# Patient Record
Sex: MALE
[De-identification: names, ages, dates, MRNs, and addresses within clinical notes are randomized per-mention and may not be internally consistent; named-entity substitution may affect disease eponyms.]

---

## 2018-11-13 ENCOUNTER — HOSPITAL ENCOUNTER (INPATIENT)
Dept: HOSPITAL 31 - C.ER | Age: 63
LOS: 5 days | Discharge: HOME | DRG: 361 | End: 2018-11-18
Attending: FAMILY MEDICINE | Admitting: FAMILY MEDICINE
Payer: COMMERCIAL

## 2018-11-13 DIAGNOSIS — R19.7: ICD-10-CM

## 2018-11-13 DIAGNOSIS — N40.1: ICD-10-CM

## 2018-11-13 DIAGNOSIS — L03.116: Primary | ICD-10-CM

## 2018-11-13 DIAGNOSIS — B95.62: ICD-10-CM

## 2018-11-13 DIAGNOSIS — L02.416: ICD-10-CM

## 2018-11-13 DIAGNOSIS — Z87.891: ICD-10-CM

## 2018-11-13 LAB
ALBUMIN SERPL-MCNC: 4.2 G/DL (ref 3.5–5)
ALBUMIN/GLOB SERPL: 1.6 {RATIO} (ref 1–2.1)
ALT SERPL-CCNC: 23 U/L (ref 21–72)
APTT BLD: 41 SECONDS (ref 21–34)
AST SERPL-CCNC: 14 U/L (ref 17–59)
BACTERIA #/AREA URNS HPF: (no result) /[HPF]
BASOPHILS # BLD AUTO: 0.1 K/UL (ref 0–0.2)
BASOPHILS NFR BLD: 1.2 % (ref 0–2)
BILIRUB UR-MCNC: NEGATIVE MG/DL
BUN SERPL-MCNC: 17 MG/DL (ref 9–20)
CALCIUM SERPL-MCNC: 8.9 MG/DL (ref 8.6–10.4)
CK MB SERPL-MCNC: 0.75 NG/ML (ref 0–3.38)
EOSINOPHIL # BLD AUTO: 0.1 K/UL (ref 0–0.7)
EOSINOPHIL NFR BLD: 0.9 % (ref 0–4)
ERYTHROCYTE [DISTWIDTH] IN BLOOD BY AUTOMATED COUNT: 14 % (ref 11.5–14.5)
GFR NON-AFRICAN AMERICAN: > 60
GLUCOSE UR STRIP-MCNC: NORMAL MG/DL
HGB BLD-MCNC: 12.5 G/DL (ref 12–18)
INR PPP: 1.2
LEUKOCYTE ESTERASE UR-ACNC: (no result) LEU/UL
LYMPHOCYTES # BLD AUTO: 1.2 K/UL (ref 1–4.3)
LYMPHOCYTES NFR BLD AUTO: 13.9 % (ref 20–40)
MCH RBC QN AUTO: 31.6 PG (ref 27–31)
MCHC RBC AUTO-ENTMCNC: 34.8 G/DL (ref 33–37)
MCV RBC AUTO: 90.8 FL (ref 80–94)
MONOCYTES # BLD: 0.9 K/UL (ref 0–0.8)
MONOCYTES NFR BLD: 11 % (ref 0–10)
NEUTROPHILS # BLD: 6.1 K/UL (ref 1.8–7)
NEUTROPHILS NFR BLD AUTO: 73 % (ref 50–75)
NRBC BLD AUTO-RTO: 0 % (ref 0–2)
PH UR STRIP: 6 [PH] (ref 5–8)
PLATELET # BLD: 225 K/UL (ref 130–400)
PMV BLD AUTO: 9.8 FL (ref 7.2–11.7)
PROT UR STRIP-MCNC: NEGATIVE MG/DL
PROTHROMBIN TIME: 13.2 SECONDS (ref 9.7–12.2)
RBC # BLD AUTO: 3.96 MIL/UL (ref 4.4–5.9)
RBC # UR STRIP: NEGATIVE /UL
SP GR UR STRIP: 1.02 (ref 1–1.03)
SQUAMOUS EPITHIAL: < 1 /HPF (ref 0–5)
UROBILINOGEN UR-MCNC: 2 MG/DL (ref 0.2–1)
WBC # BLD AUTO: 8.4 K/UL (ref 4.8–10.8)

## 2018-11-13 RX ADMIN — TAZOBACTAM SODIUM AND PIPERACILLIN SODIUM SCH MLS/HR: 375; 3 INJECTION, SOLUTION INTRAVENOUS at 22:01

## 2018-11-13 NOTE — C.PDOC
History Of Present Illness





64 yo male w/o significant PMHx come in for evaluation of Right lower leg 

painful mass gradually developed for past 1 week. Pt reports, noted some wound 

discharge now, redness around it and swelling. Pt admits, similar sx in past " 

not as bad". Otherwise, pt denies known trauma or injury, fever, chills, denies 

sore throat, cough, CP, SOB, dyspnea, denies weakness, sensory or vascular 

deficits to Right leg. Ambulate to Ed for evaluation, appears in pain.


Time Seen by Provider: 11/13/18 12:49


Chief Complaint (Nursing): Lower Extremity Problem/Injury


History Per: Patient


Onset/Duration Of Symptoms: Gradual





Past Medical History


Reviewed: Historical Data, Nursing Documentation, Vital Signs


Vital Signs: 





                                Last Vital Signs











Temp  98.7 F   11/13/18 12:34


 


Pulse  66   11/13/18 12:34


 


Resp  20   11/13/18 12:34


 


BP  108/62   11/13/18 12:34


 


Pulse Ox  98   11/13/18 12:34














- Medical History


PMH: No Chronic Diseases


Family History: States: No Known Family Hx





- Social History


Hx Tobacco Use: Yes


Hx Alcohol Use: No


Hx Substance Use: No





- Immunization History


Hx Tetanus Toxoid Vaccination: No


Hx Influenza Vaccination: No


Hx Pneumococcal Vaccination: No





Review Of Systems


Except As Marked, All Systems Reviewed And Found Negative.


Constitutional: Negative for: Fever, Chills


ENT: Negative for: Throat Pain


Cardiovascular: Negative for: Chest Pain, Palpitations


Respiratory: Negative for: Cough, Shortness of Breath, Wheezing


Gastrointestinal: Negative for: Nausea, Vomiting, Abdominal Pain


Genitourinary: Negative for: Incontinence


Musculoskeletal: Positive for: Leg Pain


Skin: Positive for: Lesions


Neurological: Negative for: Weakness, Numbness, Headache, Dizziness





Physical Exam





- Physical Exam


Appears: Well, Non-toxic, No Acute Distress


Skin: Normal Color, Warm, Other (open wound Right calf area 3cm diameter with 

greenish/bloody discharge, diffuse surround erythema, (+) prxomal streaking, (-)

flactulance.)


Head: Normacephalic


Eye(s): bilateral: PERRL


Nose: No Flaring, No Discharge


Oral Mucosa: Moist


Throat: No Erythema, No Drooling


Neck: Trachea Midline, Supple


Cardiovascular: Rhythm Regular, No Murmur, No JVD


Respiratory: No Decreased Breath Sounds, No Accessory Muscle Use, No Rales, No 

Rhonchi, No Stridor, No Wheezing


Gastrointestinal/Abdominal: Soft, No Tenderness, No Distention, No Guarding


Extremity: Normal ROM, No Pedal Edema, Calf Tenderness (Right), Capillary Refill

(less than 2sec to Right foot), No Deformity, Swelling (mild trace Right ankle 

edema.)


Neurological/Psych: Oriented x3, Normal Speech, Normal Motor, Normal Sensation, 

Normal Reflexes





ED Course And Treatment





- Laboratory Results


Result Diagrams: 


                                 11/13/18 14:09





                                 11/13/18 14:09


Lab Interpretation: No Acute Changes


O2 Sat by Pulse Oximetry: 98


Pulse Ox Interpretation: Normal





- CT Scan/US


  ** Doppler US RLE


Other Rad Studies (CT/US): Read By Radiologist


CT/US Interpretation: (-) DVT


Progress Note: Pt remained stable during the ED evaluation.  case discussed with

Hospitalist and admission arranged OBS with Dx: Right calf cellulitis with 

abscess.





Disposition





- Disposition


Disposition: HOSPITALIZED


Disposition Time: 15:16


Condition: STABLE





- Clinical Impression


Clinical Impression: 


 Cellulitis and abscess of leg

## 2018-11-13 NOTE — CP.PCM.HP
History of Present Illness





- History of Present Illness


History of Present Illness: 





Pt is a 62yo M with no PMH presenting with a painful mass on the R leg x 6 days.

He reports redness, swelling, and a throbbing pain he rates 10/10. He took 

ibuprofen at home which helped alleviate the pain slightly. He reports also usi

ng antibiotic cream at home. He reports previous history of these masses, 5 

times before. They are usually drained and resolve.He reports associated 

numbness and tingling in the area, chills, shortness of breath, nausea, and 

cough. He reports an episode of stinging chest pain on the L side, which ra

diated to the L arm and lasted for 5 min. He denies previous h/o of chest pain. 

He reports dysuria for 7 months, with associated burning, increased urinary 

frequency, and feeling of incomplete voiding. Pt denies diarrhea, abdominal 

pain, fever.





SxH: denies


FamH: mom DM, dad back problems


SocH: denies tobacco, etoh, drug us


Meds: ibuporfen, multivitamins


Allergies: NKDA


PMD: none


  





Present on Admission





- Present on Admission


Any Indicators Present on Admission: No





Review of Systems





- Review of Systems


Review of Systems: 





as per HPI








Past Patient History





- Past Social History


Smoking Status: Never Smoked





- PSYCHIATRIC


Hx Substance Use: No





- ANESTHESIA


Hx Anesthesia: No





Meds


Allergies/Adverse Reactions: 


                                    Allergies











Allergy/AdvReac Type Severity Reaction Status Date / Time


 


No Known Allergies Allergy   Verified 11/13/18 12:33














Physical Exam





- Constitutional


Appears: Well, No Acute Distress





- Head Exam


Head Exam: ATRAUMATIC, NORMOCEPHALIC





- Eye Exam


Eye Exam: EOMI, Normal appearance, PERRL


Pupil Exam: NORMAL ACCOMODATION





- ENT Exam


ENT Exam: Mucous Membranes Moist





- Respiratory Exam


Respiratory Exam: Clear to Auscultation Bilateral, NORMAL BREATHING PATTERN.  

absent: Rales, Rhonchi, Wheezes





- Cardiovascular Exam


Cardiovascular Exam: REGULAR RHYTHM, +S1, +S2.  absent: Gallop, Rubs, Systolic 

Murmur





- GI/Abdominal Exam


GI & Abdominal Exam: Normal Bowel Sounds, Soft.  absent: Firm, Tenderness





- Extremities Exam


Additional comments: 





LLE: posterior calf erythematous 3cm nodule with clear drainage and surrounding 

erythema and edema. 








- Neurological Exam


Neurological exam: Alert, Oriented x3





- Psychiatric Exam


Psychiatric exam: Normal Affect, Normal Mood





Results





- Vital Signs


Recent Vital Signs: 





                                Last Vital Signs











Temp  98.2 F   11/13/18 16:01


 


Pulse  64   11/13/18 16:01


 


Resp  16   11/13/18 16:01


 


BP  118/65   11/13/18 16:01


 


Pulse Ox  99   11/13/18 16:01














- Labs


Result Diagrams: 


                                 11/13/18 14:09





                                 11/13/18 14:09


Labs: 





                         Laboratory Results - last 24 hr











  11/13/18 11/13/18 11/13/18





  14:09 14:09 14:09


 


WBC  8.4  


 


RBC  3.96 L  


 


Hgb  12.5  


 


Hct  35.9  


 


MCV  90.8  


 


MCH  31.6 H  


 


MCHC  34.8  


 


RDW  14.0  


 


Plt Count  225  


 


MPV  9.8  


 


Neut % (Auto)  73.0  


 


Lymph % (Auto)  13.9 L  


 


Mono % (Auto)  11.0 H  


 


Eos % (Auto)  0.9  


 


Baso % (Auto)  1.2  


 


Neut # (Auto)  6.1  


 


Lymph # (Auto)  1.2  


 


Mono # (Auto)  0.9 H  


 


Eos # (Auto)  0.1  


 


Baso # (Auto)  0.1  


 


PT   13.2 H 


 


INR   1.2 


 


APTT   41 H 


 


Sodium    136


 


Potassium    4.2


 


Chloride    100


 


Carbon Dioxide    29


 


Anion Gap    10


 


BUN    17


 


Creatinine    0.7 L


 


Est GFR ( Amer)    > 60


 


Est GFR (Non-Af Amer)    > 60


 


Random Glucose    94


 


Calcium    8.9


 


Total Bilirubin    1.1


 


AST    14 L


 


ALT    23


 


Alkaline Phosphatase    55


 


Total Protein    6.9


 


Albumin    4.2


 


Globulin    2.7


 


Albumin/Globulin Ratio    1.6


 


Urine Color   


 


Urine Clarity   


 


Urine pH   


 


Ur Specific Gravity   


 


Urine Protein   


 


Urine Glucose (UA)   


 


Urine Ketones   


 


Urine Blood   


 


Urine Nitrate   


 


Urine Bilirubin   


 


Urine Urobilinogen   


 


Ur Leukocyte Esterase   


 


Urine WBC (Auto)   


 


Urine RBC (Auto)   


 


Ur Squamous Epith Cells   


 


Urine Bacteria   














  11/13/18





  16:11


 


WBC 


 


RBC 


 


Hgb 


 


Hct 


 


MCV 


 


MCH 


 


MCHC 


 


RDW 


 


Plt Count 


 


MPV 


 


Neut % (Auto) 


 


Lymph % (Auto) 


 


Mono % (Auto) 


 


Eos % (Auto) 


 


Baso % (Auto) 


 


Neut # (Auto) 


 


Lymph # (Auto) 


 


Mono # (Auto) 


 


Eos # (Auto) 


 


Baso # (Auto) 


 


PT 


 


INR 


 


APTT 


 


Sodium 


 


Potassium 


 


Chloride 


 


Carbon Dioxide 


 


Anion Gap 


 


BUN 


 


Creatinine 


 


Est GFR ( Amer) 


 


Est GFR (Non-Af Amer) 


 


Random Glucose 


 


Calcium 


 


Total Bilirubin 


 


AST 


 


ALT 


 


Alkaline Phosphatase 


 


Total Protein 


 


Albumin 


 


Globulin 


 


Albumin/Globulin Ratio 


 


Urine Color  Yellow


 


Urine Clarity  Clear


 


Urine pH  6.0


 


Ur Specific Gravity  1.019


 


Urine Protein  Negative


 


Urine Glucose (UA)  Normal


 


Urine Ketones  Negative


 


Urine Blood  Negative


 


Urine Nitrate  Negative


 


Urine Bilirubin  Negative


 


Urine Urobilinogen  2.0


 


Ur Leukocyte Esterase  Neg


 


Urine WBC (Auto)  2


 


Urine RBC (Auto)  2


 


Ur Squamous Epith Cells  < 1


 


Urine Bacteria  Rare














Assessment & Plan





- Assessment and Plan (Free Text)


Assessment: 





62yo M with no PMH presenting with a painful mass on the R leg x 6 days admitted

for evaluation and treatment of cellulitis





Plan: 





LLE Cellulitis


- previous history of nodules on LE that resolve with I&D


- likely hidratenitis suppurativa


- continue vanc and zosyn


- f/u BCx, UCx, wound Cx


- f/u procal


- f/u CT


- Doppler LE: no DVT


- pt afebrile, no leukocytosis


- tylenol PRN





Dysuria


- 7month h/o dysuria, pyruia, increased urinary frequency


- HbA1c


- f/u UA, UCx





PPX


GI: Pepcid


DVT: not indicated at this time





Case reviewed with Dr. Kingston

## 2018-11-14 LAB
ALBUMIN SERPL-MCNC: 3.9 G/DL (ref 3.5–5)
ALBUMIN/GLOB SERPL: 1.5 {RATIO} (ref 1–2.1)
ALT SERPL-CCNC: 20 U/L (ref 21–72)
ANISOCYTOSIS BLD QL SMEAR: SLIGHT
APTT BLD: 38 SECONDS (ref 21–34)
AST SERPL-CCNC: 10 U/L (ref 17–59)
BASOPHILS # BLD AUTO: 0.1 K/UL (ref 0–0.2)
BASOPHILS NFR BLD: 0.5 % (ref 0–2)
BUN SERPL-MCNC: 17 MG/DL (ref 9–20)
CALCIUM SERPL-MCNC: 8.7 MG/DL (ref 8.6–10.4)
CK MB SERPL-MCNC: 0.69 NG/ML (ref 0–3.38)
CK MB SERPL-MCNC: 0.73 NG/ML (ref 0–3.38)
EOSINOPHIL # BLD AUTO: 0 K/UL (ref 0–0.7)
EOSINOPHIL NFR BLD: 0.1 % (ref 0–4)
ERYTHROCYTE [DISTWIDTH] IN BLOOD BY AUTOMATED COUNT: 14.2 % (ref 11.5–14.5)
GFR NON-AFRICAN AMERICAN: > 60
HGB BLD-MCNC: 11.9 G/DL (ref 12–18)
INR PPP: 1.2
LYMPHOCYTE: 8 % (ref 20–40)
LYMPHOCYTES # BLD AUTO: 0.8 K/UL (ref 1–4.3)
LYMPHOCYTES NFR BLD AUTO: 6.5 % (ref 20–40)
MCH RBC QN AUTO: 30.7 PG (ref 27–31)
MCHC RBC AUTO-ENTMCNC: 34 G/DL (ref 33–37)
MCV RBC AUTO: 90.2 FL (ref 80–94)
MONOCYTE: 6 % (ref 0–10)
MONOCYTES # BLD: 0.7 K/UL (ref 0–0.8)
MONOCYTES NFR BLD: 6.3 % (ref 0–10)
NEUTROPHILS # BLD: 10.2 K/UL (ref 1.8–7)
NEUTROPHILS NFR BLD AUTO: 86 % (ref 50–75)
NEUTROPHILS NFR BLD AUTO: 86.6 % (ref 50–75)
NRBC BLD AUTO-RTO: 0 % (ref 0–2)
PLATELET # BLD EST: NORMAL 10*3/UL
PLATELET # BLD: 237 K/UL (ref 130–400)
PMV BLD AUTO: 9.6 FL (ref 7.2–11.7)
POIKILOCYTOSIS BLD QL SMEAR: SLIGHT
PROTHROMBIN TIME: 12.7 SECONDS (ref 9.7–12.2)
RBC # BLD AUTO: 3.89 MIL/UL (ref 4.4–5.9)
TOTAL CELLS COUNTED BLD: 100
WBC # BLD AUTO: 11.7 K/UL (ref 4.8–10.8)

## 2018-11-14 RX ADMIN — Medication SCH CAP: at 18:09

## 2018-11-14 RX ADMIN — Medication SCH CAP: at 10:11

## 2018-11-14 RX ADMIN — TAZOBACTAM SODIUM AND PIPERACILLIN SODIUM SCH MLS/HR: 375; 3 INJECTION, SOLUTION INTRAVENOUS at 14:12

## 2018-11-14 RX ADMIN — VANCOMYCIN HYDROCHLORIDE SCH MLS/HR: 1 INJECTION, POWDER, LYOPHILIZED, FOR SOLUTION INTRAVENOUS at 11:50

## 2018-11-14 RX ADMIN — TAZOBACTAM SODIUM AND PIPERACILLIN SODIUM SCH MLS/HR: 375; 3 INJECTION, SOLUTION INTRAVENOUS at 22:07

## 2018-11-14 RX ADMIN — TAZOBACTAM SODIUM AND PIPERACILLIN SODIUM SCH MLS/HR: 375; 3 INJECTION, SOLUTION INTRAVENOUS at 06:01

## 2018-11-14 RX ADMIN — VANCOMYCIN HYDROCHLORIDE SCH MLS/HR: 1 INJECTION, POWDER, LYOPHILIZED, FOR SOLUTION INTRAVENOUS at 23:45

## 2018-11-14 NOTE — VASCLAB
Date of service: 



11/13/2018



PROCEDURE:  Left Lower Extremity Venous Duplex Exam. 



HISTORY:

Pain, open wound 



PRIORS:

None. 



TECHNIQUE:

Left common femoral, femoral, popliteal and posterior tibial, 

peroneal and great saphenous veins were evaluated. Flow was assessed 

with color Doppler, compressibility, assessment of phasic flow and 

augmentation response.



Report prepared by   LUCINDA Vargas



FINDINGS:



LEFT:

1. Common Femoral Vein:



1.1.  Compressibility - Fully compressible: Thrombus - None : Flow - 

Phasic: Augmentation -Normal: Reflux - None.



2. Femoral Vein: 



2.1. Compressibility - Fully compressible: Thrombus -  None: Flow - 

Phasic: Augmentation -Normal: Reflux - None.



3. Popliteal Vein: 



3.1. Compressibility - Fully compressible: Thrombus -  None: Flow - 

Phasic: Augmentation -Normal: Reflux - None.



4. Posterior Tibial Vein: 



4.1. Compressibility - Fully compressible: Thrombus -  None: Flow - 

Phasic: Augmentation -Normal: Reflux - None.



5. Peroneal Vein: 



5.1. Compressibility - Fully compressible: Thrombus -  None: Flow - 

Phasic: Augmentation -Normal: Reflux - None.



6. Great Saphenous Vein:

6.1.  Compressibility - Fully compressible: Thrombus - None: Flow - 

Phasic: Augmentation - Normal: Reflux - None.





OTHER FINDINGS:  Enlarged lymph node was noted in the left groin area.



IMPRESSION:

No evidence of deep or superficial vein thrombosis of the left lower 

extremity with excellent venous flow. Normal valve function noted of 

the left side.     



Normal venous flow noted in the right common femoral vein.

## 2018-11-14 NOTE — CP.PCM.PN
<Deepthi Lau - Last Filed: 11/14/18 16:19>





Subjective





- Date & Time of Evaluation


Date of Evaluation: 11/14/18


Time of Evaluation: 11:30





- Subjective


Subjective: 


PGY-1 Medicine Progress Note for Dr. LOS Reid





Patient was seen and examined today at bedside in no acute distress. Nurse 

reports no overnight events. Patient is resting comfortably and has no new 

complaints. Fullness in calf is still bothering him same as yesterday. Denies 

headache, shortness of breath, fever, chills, difficulty moving LE, n/v/c/d.





Objective





- Vital Signs/Intake and Output


Vital Signs (last 24 hours): 


                                        











Temp Pulse Resp BP Pulse Ox


 


 97.3 F L  60   20   123/75   97 


 


 11/14/18 08:09  11/14/18 08:09  11/14/18 08:09  11/14/18 08:09  11/14/18 08:09











- Medications


Medications: 


                               Current Medications





Acetaminophen (Tylenol 325mg Tab)  650 mg PO Q6 PRN


   PRN Reason: Pain, moderate (4-7)


Acetaminophen (Tylenol 325mg Tab)  650 mg PO Q6 PRN


   PRN Reason: Fever >100.4 F


Famotidine (Pepcid)  20 mg PO BID [facility]


   Last Admin: 11/14/18 10:11 Dose:  20 mg


Heparin Sodium (Porcine) (Heparin)  5,000 units SC Q12 [facility]


   Last Admin: 11/14/18 10:12 Dose:  5,000 units


Piperacillin Sod/Tazobactam Sod (Zosyn 3.375 Gm Iv Premix)  3.375 gm in 50 mls @

200 mls/hr IVPB Q8H [facility]; Protocol


   Last Admin: 11/14/18 06:01 Dose:  200 mls/hr


Vancomycin/Sodium Chloride (Vancomycin 1 Gm/Ns 200 Ml)  1 gm in 200 mls @ 133 

mls/hr IVPB Q12H [facility]; Protocol


   Stop: 11/19/18 11:31


   Last Admin: 11/14/18 11:50 Dose:  133 mls/hr


Lactobacillus Acidophilus (Bacid Acidophilus)  1 cap PO BID [facility]


   Last Admin: 11/14/18 10:11 Dose:  1 cap


Tamsulosin HCl (Flomax)  0.4 mg PO DAILY [facility]


   Last Admin: 11/14/18 10:11 Dose:  0.4 mg











- Labs


Labs: 


                                        





                                 11/14/18 04:45 





                                 11/14/18 04:45 





                                        











PT  12.7 SECONDS (9.7-12.2)  H  11/14/18  04:45    


 


INR  1.2   11/14/18  04:45    


 


APTT  38 SECONDS (21-34)  H  11/14/18  04:45    














- Constitutional


Appears: Well, No Acute Distress





- Head Exam


Head Exam: ATRAUMATIC, NORMOCEPHALIC





- Eye Exam


Eye Exam: EOMI, Normal appearance, PERRL





- ENT Exam


ENT Exam: Mucous Membranes Moist





- Respiratory Exam


Respiratory Exam: Clear to Ausculation Bilateral, NORMAL BREATHING PATTERN.  

absent: Rales, Rhonchi, Wheezes





- Cardiovascular Exam


Cardiovascular Exam: REGULAR RHYTHM, +S1, +S2.  absent: Gallop, Rubs, Murmur





- GI/Abdominal Exam


GI & Abdominal Exam: Soft, Normal Bowel Sounds.  absent: Distended, Firm, 

Tenderness





-  Exam


Additional comments: 


MIGNON performed by Dr. LOS Reid, palpable enlarged prostate





- Extremities Exam


Extremities Exam: Normal Capillary Refill.  absent: Calf Tenderness, Pedal Edema


Additional comments: 


peripheral pulses palpable bilaterally (radial, DP)


IV access in R arm





- Neurological Exam


Neurological Exam: Alert, Awake, Oriented x3





- Psychiatric Exam


Psychiatric exam: Normal Affect, Normal Mood





- Skin


Skin Exam: Normal Color


Additional comments: 


LLE: posterior calf erythematous 3cm nodule with clear drainage and surrounding 

erythema and edema. 


multiple healing wounds from previous abscesses





Assessment and Plan





- Assessment and Plan (Free Text)


Assessment: 


64yo M with no PMH presenting with a painful mass on the R leg x 6 days admitted

for evaluation and treatment of cellulitis


Plan: 


LLE Cellulitis


- previous history of nodules on LE that resolve with I&D


- likely hidratenitis suppurativa


- Wound Cx (11/13): gram positive cocci, pending species and sensitivities


- Urine Cx (11/13): neg


- f/u blood Cx, MRSA screen


- Vanc 1g IVPB q12 (started 11/13)


   Vanc Trough 11/15@1100


- Zosyn 3.375g IVPB q8 (started 11/13)


- Procal <0.05


- CT LE (11/13): 2.5x1.0cm dermal thickenin and phlegmon at midline posterior 

calf subQ fat and dermis with L leg cellulitis, no appreciable abscess, no 

osteomyelitis.


- Doppler LE: no DVT


- pt afebrile, no leukocytosis


- tylenol PRN


- Surgery consulted: Dr. BALAJI Reid - help appreciated





Dysuria


- 7month h/o dysuria, pyruia, increased urinary frequency


- Hgb A1c: 5.2


- UA neg


- Urine Cx neg


- Flomax 0.4mg po daily





PPX


DVT: Heparin 5000u sc q12


GI: Pepcid 20 po bid


Diet: HHD





d/w Dr. LOS Lau PGY-1





<Checo Reid - Last Filed: 11/17/18 20:00>





Objective





- Vital Signs/Intake and Output


Vital Signs (last 24 hours): 


                                        











Temp Pulse Resp BP Pulse Ox


 


 98.3 F   56 L  20   118/73   96 


 


 11/17/18 15:17  11/17/18 15:17  11/17/18 15:17  11/17/18 15:17  11/17/18 15:17








Intake and Output: 


                                        











 11/17/18 11/18/18





 18:59 06:59


 


Intake Total 450 


 


Balance 450 














- Medications


Medications: 


                               Current Medications





Acetaminophen (Tylenol 325mg Tab)  650 mg PO Q6 PRN


   PRN Reason: Pain, moderate (4-7)


   Last Admin: 11/17/18 07:18 Dose:  650 mg


Acetaminophen (Tylenol 325mg Tab)  650 mg PO Q6 PRN


   PRN Reason: Fever >100.4 F


Famotidine (Pepcid)  20 mg PO BID [facility]


   Last Admin: 11/17/18 17:51 Dose:  20 mg


Heparin Sodium (Porcine) (Heparin)  5,000 units SC Q12 [facility]


Clindamycin Phosphate (Cleocin 600mg/50ml Ns)  600 mg in 50 mls @ 100 mls/hr IVP

B Q8H [facility]; Protocol


   Last Admin: 11/17/18 19:21 Dose:  100 mls/hr


Ibuprofen (Motrin Tab)  600 mg PO TID PRN


   PRN Reason: Pain, moderate (4-7)


   Last Admin: 11/17/18 10:03 Dose:  600 mg


Lactobacillus Acidophilus (Bacid Acidophilus)  1 cap PO BID [facility]


   Last Admin: 11/17/18 17:51 Dose:  1 cap


Tamsulosin HCl (Flomax)  0.4 mg PO DAILY [facility]


   Last Admin: 11/17/18 10:03 Dose:  0.4 mg











- Labs


Labs: 


                                        





                                 11/17/18 06:35 





                                 11/17/18 06:35 





                                        











PT  11.9 SECONDS (9.7-12.2)   11/16/18  06:46    


 


INR  1.1   11/16/18  06:46    


 


APTT  37 SECONDS (21-34)  H  11/16/18  06:46    














Attending/Attestation





- Attestation


I have personally seen and examined this patient.: Yes


I have fully participated in the care of the patient.: Yes


I have reviewed all pertinent clinical information, including history, physical 

exam and plan: Yes


Notes (Text): 





11/17/18 19:57


This is a late entry.





Care of this patient was gone over in detail with resident Dr. Ramos.





Patient was seen and examined at 8:15 AM on 11/14/18.





Noted enlarged smooth prostate upon rectal exam


Patient complained of LUTS at the time of my exam: at times difficulty starting 

urinary stream, feeling of incomplete evacuation of bladder, at times difficulty

with erection


Flomax started


Patient was counseled on the need for further Urology evaluation upon his 

discharge. He expressed understanding.





Checo Reid D.O.

## 2018-11-14 NOTE — CT
Date of service: 



11/14/2018



PROCEDURE:  LOWER EXTREMITY CT, LEG, WITH CONTRAST



HISTORY:

cellulitis; open wound left leg 



COMPARISON:

No prior CT or MRI available. 



TECHNIQUE:

A volumetric CT acquisition through the left leg was performed from 

the left knee to the ankle prior to and following intravenous 

contrast administration.



Contrast Dose: Visipaque 320, 100 cc



Radiation dose:Total exam DLP = 1076.00 mGy-cm.



This CT exam was performed using one or more of the following dose 

reduction techniques: Automated exposure control, adjustment of the 

mA and/or kV according to patient size, and/or use of iterative 

reconstruction technique.







FINDINGS:

Relatively extensive cellulitis changes are seen throughout the left 

leg concentrated at the mid to lower left leg and ankle and manifest 

by increased reticular changes in superficial and deep subcutaneous 

fat as well as dermal thickening.  No emphysematous change are 

identified or definitive retained radiodense foreign body.  There is 

a focal area of dermal thickening and subcutaneous edema and likely 

phlegmon at the skin medially posterior to the mid calf level at the 

posterior midline.  No peripheral enhancement is appropriate socially 

aided that would suggest an abscess at this time.  Clinically 

correlate further.  This small area measures 2.5 x 1.0 cm.



No lytic or blastic bony changes are identified related to the tibia 

or the fibula and the vascular sheaths appear unremarkable as imaged.



IMPRESSION:

1. There is a 2.5 x 1.0 cm of dermal thickening and phlegmon at the 

midline posterior calf subcutaneous fat and dermis as well as diffuse 

left leg cellulitis.  No definitive abscess appreciable at this time. 



2. No overt CT pattern to suggest osteomyelitis.  No suspicious 

contrast enhancement pattern.

## 2018-11-14 NOTE — RAD
Date of service: 



11/14/2018



HISTORY:

 SOB/Cough upon admission 



COMPARISON:

No prior.



TECHNIQUE:

Chest PA and lateral



FINDINGS:



LUNGS:

No active pulmonary disease.



PLEURA:

No significant pleural effusion identified. No pneumothorax apparent.



CARDIOVASCULAR:

No aortic atherosclerotic calcification present.



Normal cardiac size. No pulmonary vascular congestion. 



OSSEOUS STRUCTURES:

No significant abnormalities.



VISUALIZED UPPER ABDOMEN:

Normal.



OTHER FINDINGS:

None.



IMPRESSION:

No active disease.

## 2018-11-15 LAB
ALBUMIN SERPL-MCNC: 3.9 G/DL (ref 3.5–5)
ALBUMIN/GLOB SERPL: 1.5 {RATIO} (ref 1–2.1)
ALT SERPL-CCNC: 26 U/L (ref 21–72)
APTT BLD: 37 SECONDS (ref 21–34)
AST SERPL-CCNC: 16 U/L (ref 17–59)
BASOPHILS # BLD AUTO: 0.1 K/UL (ref 0–0.2)
BASOPHILS NFR BLD: 1.2 % (ref 0–2)
BUN SERPL-MCNC: 16 MG/DL (ref 9–20)
CALCIUM SERPL-MCNC: 8.6 MG/DL (ref 8.6–10.4)
EOSINOPHIL # BLD AUTO: 0.1 K/UL (ref 0–0.7)
EOSINOPHIL NFR BLD: 1.6 % (ref 0–4)
ERYTHROCYTE [DISTWIDTH] IN BLOOD BY AUTOMATED COUNT: 13.5 % (ref 11.5–14.5)
GFR NON-AFRICAN AMERICAN: > 60
HGB BLD-MCNC: 12.5 G/DL (ref 12–18)
INR PPP: 1
LYMPHOCYTES # BLD AUTO: 1.9 K/UL (ref 1–4.3)
LYMPHOCYTES NFR BLD AUTO: 24.3 % (ref 20–40)
MCH RBC QN AUTO: 34.9 PG (ref 27–31)
MCHC RBC AUTO-ENTMCNC: 35.5 G/DL (ref 33–37)
MCV RBC AUTO: 98.3 FL (ref 80–94)
MONOCYTES # BLD: 0.8 K/UL (ref 0–0.8)
MONOCYTES NFR BLD: 10.2 % (ref 0–10)
NEUTROPHILS # BLD: 5 K/UL (ref 1.8–7)
NEUTROPHILS NFR BLD AUTO: 62.7 % (ref 50–75)
NRBC BLD AUTO-RTO: 0 % (ref 0–2)
PLATELET # BLD: 266 K/UL (ref 130–400)
PMV BLD AUTO: 10.2 FL (ref 7.2–11.7)
PROTHROMBIN TIME: 11.3 SECONDS (ref 9.7–12.2)
RBC # BLD AUTO: 3.58 MIL/UL (ref 4.4–5.9)
WBC # BLD AUTO: 8 K/UL (ref 4.8–10.8)

## 2018-11-15 PROCEDURE — 0J9P0ZZ DRAINAGE OF LEFT LOWER LEG SUBCUTANEOUS TISSUE AND FASCIA, OPEN APPROACH: ICD-10-PCS | Performed by: SURGERY

## 2018-11-15 PROCEDURE — 0HBLXZZ EXCISION OF LEFT LOWER LEG SKIN, EXTERNAL APPROACH: ICD-10-PCS | Performed by: SURGERY

## 2018-11-15 RX ADMIN — Medication SCH CAP: at 09:57

## 2018-11-15 RX ADMIN — TAZOBACTAM SODIUM AND PIPERACILLIN SODIUM SCH: 375; 3 INJECTION, SOLUTION INTRAVENOUS at 15:19

## 2018-11-15 RX ADMIN — Medication SCH CAP: at 22:12

## 2018-11-15 RX ADMIN — CLINDAMYCIN PHOSPHATE SCH MLS/HR: 150 INJECTION, SOLUTION INTRAVENOUS at 16:30

## 2018-11-15 RX ADMIN — VANCOMYCIN HYDROCHLORIDE SCH MLS/HR: 1 INJECTION, POWDER, LYOPHILIZED, FOR SOLUTION INTRAVENOUS at 12:11

## 2018-11-15 RX ADMIN — CLINDAMYCIN PHOSPHATE SCH MLS/HR: 150 INJECTION, SOLUTION INTRAVENOUS at 22:37

## 2018-11-15 RX ADMIN — TAZOBACTAM SODIUM AND PIPERACILLIN SODIUM SCH MLS/HR: 375; 3 INJECTION, SOLUTION INTRAVENOUS at 06:41

## 2018-11-15 NOTE — PCM.SURG1
Surgeon's Initial Post Op Note





- Surgeon's Notes


Surgeon: Dr. Finch


Assistant: Dr. Mcclain PGY-3


Type of Anesthesia: Local


Pre-Operative Diagnosis: Left leg abscess


Operative Findings: Informed consent was obtained, and time out with nurse 

performed. Pt was prepped and draped in sterile fashion. 10cc of 1% Lidocaine 

with Epi were used to anesthetize the left leg region with abscess. #11 blade 

was used to make a linear incision into the abscess, about 10cc of hematoma 

mixed with purulent fluid were expressed from the wound. Cultures were obtained.

Sharp dissection was also performed to debride necrotic tissue from the wound 

bed/skin edges which was sent for pathology. Wound was irrigated with copious 

amounts of saline and wet to dry packing applied. Pt tolerated the procedure 

well.


Post-Operative Diagnosis: Same


Operation Performed: Incision & Drainage/Debridement of Left Leg Abscess


Specimen/Specimens Removed: debrided tissue


Estimated Blood Loss: EBL {In ML}: 5


Blood Products Given: N/A


Drains Used: No Drains


Post-Op Condition: Good


Date of Surgery/Procedure: 11/15/18


Time of Surgery/Procedure: 14:02

## 2018-11-15 NOTE — CP.PCM.CON
History of Present Illness





- History of Present Illness


History of Present Illness: 





Surgery Consult: Dr. Finch





Patient is a 62 y/o M with no PMHx, who presented to  on 11/13/18 for 

mass/abscess on L lower leg x 6 days. Pt was started on IV ABx and surgery 

consulted to evaluate. Currently the pain is 6-7/10, at worst 10/10 8 days ago, 

non radiating. He took Tylenol at home with some relief and antibacterial cream.

Since yesterday he reports a decrease in the margins of the erythema and pain. 

He has had 5 previous episodes of similar abscesses on both lower extremities 

and buttocks, they were drained at [facility] On ROS denies chest

pain, palpitations, tightness, SOB, nausea, vomiting, diarrhea and constipation.

He reports intermittent increase in urinary frequency.   





PMH: denies


PSH: Multiple I & D of abscesses on lower extremities and buttocks


Fam Hx: None


Social: Denies tobacco, alcohol, or recreational drug use. 


Allergies: NKDA





Review of Systems





- Review of Systems


Systems not reviewed;Unavailable: Acuity of Condition





- Constitutional


Constitutional: As Per HPI.  absent: Chills, Fever, Headache, Lethargy, Malaise





- Cardiovascular


Cardiovascular: absent: Chest Pain, Dyspnea, Edema, Leg Edema, Lightheadedness, 

Palpitations





- Respiratory


Respiratory: As Per HPI.  absent: Cough, Dyspnea on Exertion, Wheezing





- Gastrointestinal


Gastrointestinal: absent: Abdominal Pain, Change in Stool Character, 

Constipation, Diarrhea, Nausea, Vomiting





- Genitourinary


Genitourinary: Urinary Frequency.  absent: Urinary Hesitance, Urinary Urgency





- Musculoskeletal


Musculoskeletal: absent: Numbness, Tingling





- Neurological


Neurological: absent: Confusion, Dizziness, Numbness, Headaches





Past Patient History





- Past Social History


Smoking Status: Never Smoked





- PSYCHIATRIC


Hx Substance Use: No





- ANESTHESIA


Hx Anesthesia: No





Meds


Allergies/Adverse Reactions: 


                                    Allergies











Allergy/AdvReac Type Severity Reaction Status Date / Time


 


No Known Allergies Allergy   Verified 11/13/18 12:33














- Medications


Medications: 


                               Current Medications





Acetaminophen (Tylenol 325mg Tab)  650 mg PO Q6 PRN


   PRN Reason: Pain, moderate (4-7)


   Last Admin: 11/15/18 00:35 Dose:  650 mg


Acetaminophen (Tylenol 325mg Tab)  650 mg PO Q6 PRN


   PRN Reason: Fever >100.4 F


Famotidine (Pepcid)  20 mg PO BID [facility]


   Last Admin: 11/15/18 09:57 Dose:  20 mg


Heparin Sodium (Porcine) (Heparin)  5,000 units SC Q12 [facility]


   Last Admin: 11/15/18 09:57 Dose:  5,000 units


Piperacillin Sod/Tazobactam Sod (Zosyn 3.375 Gm Iv Premix)  3.375 gm in 50 mls @

200 mls/hr IVPB Q8H [facility]; Protocol


   Last Admin: 11/15/18 06:41 Dose:  200 mls/hr


Vancomycin/Sodium Chloride (Vancomycin 1 Gm/Ns 200 Ml)  1 gm in 200 mls @ 133 

mls/hr IVPB Q12H [facility]; Protocol


   Stop: 11/19/18 11:31


   Last Admin: 11/14/18 23:45 Dose:  133 mls/hr


Lactobacillus Acidophilus (Bacid Acidophilus)  1 cap PO BID [facility]


   Last Admin: 11/15/18 09:57 Dose:  1 cap


Tamsulosin HCl (Flomax)  0.4 mg PO DAILY [facility]


   Last Admin: 11/15/18 09:57 Dose:  0.4 mg











Physical Exam





- Constitutional


Appears: Well, No Acute Distress





- Head Exam


Head Exam: ATRAUMATIC, NORMOCEPHALIC





- Eye Exam


Eye Exam: EOMI, Normal appearance





- ENT Exam


ENT Exam: Mucous Membranes Moist





- Respiratory Exam


Respiratory Exam: Clear to Auscultation Bilateral.  absent: Rales, Rhonchi, 

Wheezes





- Cardiovascular Exam


Cardiovascular Exam: REGULAR RHYTHM, +S1, +S2.  absent: Rubs, Systolic Murmur





- GI/Abdominal Exam


GI & Abdominal Exam: Normal Bowel Sounds, Soft.  absent: Organomegaly, 

Tenderness





- Extremities Exam


Extremities exam: Positive for: normal capillary refill, tenderness, pedal 

pulses present


Additional comments: 





3x4cm fluctuant mass on posterior L leg with surrounding erythema, no purulent 

drainage, tender to palpation





- Neurological Exam


Neurological exam: Alert, Oriented x3





- Psychiatric Exam


Psychiatric exam: Normal Affect, Normal Mood





- Skin


Skin Exam: Dry, Warm





Results





- Vital Signs


Recent Vital Signs: 


                                Last Vital Signs











Temp  97.5 F L  11/14/18 23:35


 


Pulse  73   11/14/18 23:35


 


Resp  20   11/14/18 23:35


 


BP  139/81   11/14/18 23:35


 


Pulse Ox  98   11/15/18 04:02














- Labs


Result Diagrams: 


                                 11/15/18 08:18





                                 11/15/18 08:18


Labs: 


                         Laboratory Results - last 24 hr











  11/14/18 11/15/18 11/15/18





  04:45 08:18 08:18


 


WBC   8.0 


 


RBC   3.58 L 


 


Hgb   12.5 


 


Hct   35.2 


 


MCV   98.3 H D 


 


MCH   34.9 H 


 


MCHC   35.5 


 


RDW   13.5 


 


Plt Count   266 


 


MPV   10.2 


 


Neut % (Auto)   62.7 


 


Lymph % (Auto)   24.3 


 


Mono % (Auto)   10.2 H 


 


Eos % (Auto)   1.6 


 


Baso % (Auto)   1.2 


 


Neut # (Auto)   5.0 


 


Lymph # (Auto)   1.9 


 


Mono # (Auto)   0.8 


 


Eos # (Auto)   0.1 


 


Baso # (Auto)   0.1 


 


PT    11.3


 


INR    1.0


 


APTT    37 H


 


Sodium   


 


Potassium   


 


Chloride   


 


Carbon Dioxide   


 


Anion Gap   


 


BUN   


 


Creatinine   


 


Est GFR ( Amer)   


 


Est GFR (Non-Af Amer)   


 


Random Glucose   


 


Calcium   


 


Total Bilirubin   


 


AST   


 


ALT   


 


Alkaline Phosphatase   


 


Total Protein   


 


Albumin   


 


Globulin   


 


Albumin/Globulin Ratio   


 


Procalcitonin  < 0.05 L  














  11/15/18





  08:18


 


WBC 


 


RBC 


 


Hgb 


 


Hct 


 


MCV 


 


MCH 


 


MCHC 


 


RDW 


 


Plt Count 


 


MPV 


 


Neut % (Auto) 


 


Lymph % (Auto) 


 


Mono % (Auto) 


 


Eos % (Auto) 


 


Baso % (Auto) 


 


Neut # (Auto) 


 


Lymph # (Auto) 


 


Mono # (Auto) 


 


Eos # (Auto) 


 


Baso # (Auto) 


 


PT 


 


INR 


 


APTT 


 


Sodium  138


 


Potassium  4.1


 


Chloride  102


 


Carbon Dioxide  26


 


Anion Gap  15


 


BUN  16


 


Creatinine  0.8


 


Est GFR ( Amer)  > 60


 


Est GFR (Non-Af Amer)  > 60


 


Random Glucose  100


 


Calcium  8.6


 


Total Bilirubin  0.3


 


AST  16 L D


 


ALT  26


 


Alkaline Phosphatase  47


 


Total Protein  6.6


 


Albumin  3.9


 


Globulin  2.7


 


Albumin/Globulin Ratio  1.5


 


Procalcitonin 














- Imaging and Cardiology


  ** CT LLE


Status: Image reviewed by me, Report reviewed by me





Assessment & Plan





- Assessment and Plan (Free Text)


Assessment: 


63M with posterior LLE abscess 





Plan: 





- plan for bedside I&D


- cont IV ABX


- local wound care


- d/w Dr. Stef Mcclain

## 2018-11-15 NOTE — CP.PCM.PN
Subjective





- Date & Time of Evaluation


Date of Evaluation: 11/15/18


Time of Evaluation: 09:50





- Subjective


Subjective: 


PGY-1 Medicine Progress Note for Dr. Ramos





Patient was seen and examined today at bedside in no acute distress. Nurse 

reports no overnight events. Patient has no new complaints. He states the 

tenderness in his calf has receded, however the hard feeling when touching it 

still distresses him. Denies fever, chills, chest pain, shortness of breath, 

abdominal pain, numbness, tingling, n/v/c/d.





Objective





- Vital Signs/Intake and Output


Vital Signs (last 24 hours): 


                                        











Temp Pulse Resp BP Pulse Ox


 


 97.5 F L  73   20   139/81   98 


 


 11/14/18 23:35  11/14/18 23:35  11/14/18 23:35  11/14/18 23:35  11/15/18 04:02








Intake and Output: 


                                        











 11/15/18 11/15/18





 06:59 18:59


 


Intake Total 940 


 


Balance 940 














- Medications


Medications: 


                               Current Medications





Acetaminophen (Tylenol 325mg Tab)  650 mg PO Q6 PRN


   PRN Reason: Pain, moderate (4-7)


   Last Admin: 11/15/18 00:35 Dose:  650 mg


Acetaminophen (Tylenol 325mg Tab)  650 mg PO Q6 PRN


   PRN Reason: Fever >100.4 F


Famotidine (Pepcid)  20 mg PO BID [facility]


   Last Admin: 11/15/18 09:57 Dose:  20 mg


Heparin Sodium (Porcine) (Heparin)  5,000 units SC Q12 [facility]


   Last Admin: 11/15/18 09:57 Dose:  5,000 units


Piperacillin Sod/Tazobactam Sod (Zosyn 3.375 Gm Iv Premix)  3.375 gm in 50 mls @

200 mls/hr IVPB Q8H [facility]; Protocol


   Last Admin: 11/15/18 06:41 Dose:  200 mls/hr


Vancomycin/Sodium Chloride (Vancomycin 1 Gm/Ns 200 Ml)  1 gm in 200 mls @ 133 

mls/hr IVPB Q12H [facility]; Protocol


   Stop: 11/19/18 11:31


   Last Admin: 11/15/18 12:11 Dose:  133 mls/hr


Lactobacillus Acidophilus (Bacid Acidophilus)  1 cap PO BID [facility]


   Last Admin: 11/15/18 09:57 Dose:  1 cap


Tamsulosin HCl (Flomax)  0.4 mg PO DAILY [facility]


   Last Admin: 11/15/18 09:57 Dose:  0.4 mg











- Labs


Labs: 


                                        





                                 11/15/18 08:18 





                                 11/15/18 08:18 





                                        











PT  11.3 SECONDS (9.7-12.2)   11/15/18  08:18    


 


INR  1.0   11/15/18  08:18    


 


APTT  37 SECONDS (21-34)  H  11/15/18  08:18    














- Constitutional


Appears: Well, No Acute Distress





- Head Exam


Head Exam: ATRAUMATIC, NORMOCEPHALIC





- Eye Exam


Eye Exam: EOMI, Normal appearance, PERRL





- ENT Exam


ENT Exam: Mucous Membranes Moist





- Respiratory Exam


Respiratory Exam: Clear to Ausculation Bilateral, NORMAL BREATHING PATTERN.  

absent: Rales, Rhonchi, Wheezes





- Cardiovascular Exam


Cardiovascular Exam: REGULAR RHYTHM, +S1, +S2.  absent: Gallop, Rubs, Murmur





- GI/Abdominal Exam


GI & Abdominal Exam: Distended, Soft, Normal Bowel Sounds.  absent: Firm, 

Guarding, Rigid, Tenderness





- Extremities Exam


Extremities Exam: Normal Capillary Refill.  absent: Pedal Edema


Additional comments: 


peripheral pulses palpable bilaterally (radial, DP)


IV access in R arm





- Neurological Exam


Neurological Exam: Alert, Awake, Normal Gait, Oriented x3





- Psychiatric Exam


Psychiatric exam: Normal Affect, Normal Mood





- Skin


Skin Exam: Normal Color


Additional comments: 


LLE: posterior calf erythematous 3cm nodule with clear drainage and surrounding 

erythema and edema. 


multiple healing wounds from previous abscesses





Assessment and Plan





- Assessment and Plan (Free Text)


Assessment: 


64yo M with no PMH presenting with a painful mass on the L leg x 6 days admitted

for evaluation and treatment of phlegmon


Plan: 


LLE Cellulitis and Phlegmon


- previous history of nodules on LE that resolve with I&D


- likely hidratenitis suppurativa


- Wound Cx (11/13): MRSA, sensitive to Vanc, Clinda


   Isolation precautions taken


- Urine Cx (11/13): neg


- Blood Cx (11/13): neg @ 24hrs


- f/u MRSA nares screen


- Vanc 1g IVPB q12 (11/13-11/15)


   Vanc Trough 11/15@1100: 7.7


- Zosyn 3.375g IVPB q8 (11/13-11/15)


- Clindamycin 400mg IVPB q8 (started 11/15)


- Procal <0.05


- CT LE (11/13): 2.5x1.0cm dermal thickening and phlegmon at midline posterior 

calf subQ fat and dermis with L leg cellulitis, no appreciable abscess, no os

teomyelitis.


- Doppler LE: no DVT


- pt afebrile, no leukocytosis


- tylenol PRN


- Surgery consulted: Dr. Finch - help appreciated


   Bedside I&D 11/15


   f/u wound Cx and pathology





Dysuria


- 7month h/o dysuria, pyruia, increased urinary frequency


- Hgb A1c: 5.2


- UA neg


- Urine Cx neg


- Flomax 0.4mg po daily





PPX


DVT: Heparin 5000u sc q12


GI: Pepcid 20 po bid


Diet: HHD





d/w Dr. Richard Lau PGY-1

## 2018-11-16 LAB
ALBUMIN SERPL-MCNC: 3.4 G/DL (ref 3.5–5)
ALBUMIN/GLOB SERPL: 1.3 {RATIO} (ref 1–2.1)
ALT SERPL-CCNC: 30 U/L (ref 21–72)
APTT BLD: 37 SECONDS (ref 21–34)
AST SERPL-CCNC: 14 U/L (ref 17–59)
BASOPHILS # BLD AUTO: 0.1 K/UL (ref 0–0.2)
BASOPHILS NFR BLD: 1.2 % (ref 0–2)
BUN SERPL-MCNC: 16 MG/DL (ref 9–20)
CALCIUM SERPL-MCNC: 8.5 MG/DL (ref 8.6–10.4)
EOSINOPHIL # BLD AUTO: 0.2 K/UL (ref 0–0.7)
EOSINOPHIL NFR BLD: 2.4 % (ref 0–4)
ERYTHROCYTE [DISTWIDTH] IN BLOOD BY AUTOMATED COUNT: 14 % (ref 11.5–14.5)
GFR NON-AFRICAN AMERICAN: > 60
HGB BLD-MCNC: 11.9 G/DL (ref 12–18)
INR PPP: 1.1
LYMPHOCYTES # BLD AUTO: 1.8 K/UL (ref 1–4.3)
LYMPHOCYTES NFR BLD AUTO: 29 % (ref 20–40)
MCH RBC QN AUTO: 31.5 PG (ref 27–31)
MCHC RBC AUTO-ENTMCNC: 34.2 G/DL (ref 33–37)
MCV RBC AUTO: 92 FL (ref 80–94)
MONOCYTES # BLD: 0.7 K/UL (ref 0–0.8)
MONOCYTES NFR BLD: 11.3 % (ref 0–10)
NEUTROPHILS # BLD: 3.5 K/UL (ref 1.8–7)
NEUTROPHILS NFR BLD AUTO: 56.1 % (ref 50–75)
NRBC BLD AUTO-RTO: 0 % (ref 0–2)
PLATELET # BLD: 226 K/UL (ref 130–400)
PMV BLD AUTO: 9.6 FL (ref 7.2–11.7)
PROTHROMBIN TIME: 11.9 SECONDS (ref 9.7–12.2)
RBC # BLD AUTO: 3.77 MIL/UL (ref 4.4–5.9)
WBC # BLD AUTO: 6.2 K/UL (ref 4.8–10.8)

## 2018-11-16 RX ADMIN — CLINDAMYCIN PHOSPHATE SCH MLS/HR: 150 INJECTION, SOLUTION INTRAMUSCULAR; INTRAVENOUS at 19:20

## 2018-11-16 RX ADMIN — Medication SCH CAP: at 17:39

## 2018-11-16 RX ADMIN — Medication SCH CAP: at 10:44

## 2018-11-16 RX ADMIN — CLINDAMYCIN PHOSPHATE SCH MLS/HR: 150 INJECTION, SOLUTION INTRAMUSCULAR; INTRAVENOUS at 10:56

## 2018-11-16 NOTE — CP.PCM.PN
<Deepthi Lau - Last Filed: 11/16/18 15:40>





Subjective





- Date & Time of Evaluation


Date of Evaluation: 11/16/18


Time of Evaluation: 07:05





- Subjective


Subjective: 


PGY-1 Medicine Progress Note for Dr. Murphy





Patient was seen and examined today at bedside in no acute distress. Nurse 

reports no overnight events. Patient complains of pain because of surgery 

removing and replacing packing. He is otherwise walking around and having no 

issues. He makes it known he would like to be discharged as soon as possible so 

he can return to work. Denies chest pain, shortness of breath, fever, chills, 

difficulty walking, difficulty urinating, abdominal pain, headache, nausea, 

vomiting, constipation, diarrhea.





Objective





- Vital Signs/Intake and Output


Vital Signs (last 24 hours): 


                                        











Temp Pulse Resp BP Pulse Ox


 


 97.9 F   61   20   140/89   98 


 


 11/16/18 07:30  11/16/18 07:30  11/16/18 07:30  11/16/18 07:30  11/16/18 07:30








Intake and Output: 


                                        











 11/16/18 11/16/18





 06:59 18:59


 


Intake Total 600 


 


Balance 600 














- Medications


Medications: 


                               Current Medications





Acetaminophen (Tylenol 325mg Tab)  650 mg PO Q6 PRN


   PRN Reason: Pain, moderate (4-7)


   Last Admin: 11/16/18 07:09 Dose:  650 mg


Acetaminophen (Tylenol 325mg Tab)  650 mg PO Q6 PRN


   PRN Reason: Fever >100.4 F


Famotidine (Pepcid)  20 mg PO BID [facility]


   Last Admin: 11/15/18 18:05 Dose:  20 mg


Heparin Sodium (Porcine) (Heparin)  5,000 units SC Q12 [facility]


   Last Admin: 11/15/18 22:12 Dose:  5,000 units


Clindamycin Phosphate 400 mg/ (Sodium Chloride)  52.6667 mls @ 100 mls/hr IVPB 

Q8H [facility]; Protocol


   Last Admin: 11/15/18 22:37 Dose:  100 mls/hr


Lactobacillus Acidophilus (Bacid Acidophilus)  1 cap PO BID [facility]


   Last Admin: 11/15/18 22:12 Dose:  1 cap


Tamsulosin HCl (Flomax)  0.4 mg PO DAILY [facility]


   Last Admin: 11/15/18 09:57 Dose:  0.4 mg











- Labs


Labs: 


                                        





                                 11/16/18 06:46 





                                 11/16/18 06:46 





                                        











PT  11.9 SECONDS (9.7-12.2)   11/16/18  06:46    


 


INR  1.1   11/16/18  06:46    


 


APTT  37 SECONDS (21-34)  H  11/16/18  06:46    














- Constitutional


Appears: Well, No Acute Distress





- Head Exam


Head Exam: ATRAUMATIC, NORMOCEPHALIC





- Eye Exam


Eye Exam: EOMI, Normal appearance, PERRL





- ENT Exam


ENT Exam: Mucous Membranes Moist





- Respiratory Exam


Respiratory Exam: Clear to Ausculation Bilateral, NORMAL BREATHING PATTERN.  

absent: Rales, Rhonchi, Wheezes





- Cardiovascular Exam


Cardiovascular Exam: REGULAR RHYTHM, +S1, +S2.  absent: Gallop, Rubs, Murmur





- GI/Abdominal Exam


GI & Abdominal Exam: Distended, Soft, Normal Bowel Sounds.  absent: Firm, 

Guarding, Tenderness





- Extremities Exam


Extremities Exam: Normal Capillary Refill.  absent: Calf Tenderness, Pedal Edema


Additional comments: 


peripheral pulses palpable bilaterally (radial, DP)


IV access in R arm





- Neurological Exam


Neurological Exam: Alert, Awake, Normal Gait, Oriented x3





- Psychiatric Exam


Psychiatric exam: Anxious, Normal Affect, Normal Mood





- Skin


Skin Exam: Dry, Normal Color, Warm


Additional comments: 


LLE: posterior calf erythema. unable to visualize as surgery had come by to 

repack the wound. dressings c/d/i


calf tenderness improved


multiple healing wounds from previous abscesses





Assessment and Plan





- Assessment and Plan (Free Text)


Assessment: 


62yo M with no PMH presenting with a painful mass on the L leg x 6 days admitted

for evaluation and treatment of phlegmon s/p bedside I&D POD#1.


Plan: 


LLE Cellulitis and Phlegmon


- previous history of nodules on LE that resolve with I&D


- likely hidratenitis suppurativa


- Wound Cx (11/13): MRSA, sensitive to Vanc, Clinda


   Isolation precautions taken


- Urine Cx (11/13): neg


- Blood Cx (11/13): neg @ 48hrs


- MRSA nares screen neg


- Vanc 1g IVPB q12 (11/13-11/15)


   Vanc Trough 11/15@1100: 7.7


- Zosyn 3.375g IVPB q8 (11/13-11/15)


- increased Clindamycin 400mg IVPB q8 (started 11/15)


- Procal <0.05


- CT LE (11/13): 2.5x1.0cm dermal thickening and phlegmon at midline posterior 

calf subQ fat and dermis with L leg cellulitis, no appreciable abscess, no 

osteomyelitis.


- Doppler LE: no DVT


- pt afebrile, no leukocytosis


- tylenol PRN


- Surgery consulted: Dr. Finch - help appreciated


   Bedside I&D 11/15


   f/u wound Cx and pathology: prelim gram + cocci in clusters





BPH


- 7month h/o dysuria, pyruia, increased urinary frequency


- Hgb A1c: 5.2


- UA neg


- Urine Cx neg


- Flomax 0.4mg po daily





PPX


DVT: Heparin 5000u sc q12


GI: Pepcid 20 po bid


Diet: HHD


Dispo: Discharge once repeat wound Cx confirms no other infection and what is 

there is sensitive to Clinda. Switch to PO abx through 11/24.





d/w Dr. Katherine Lau PGY-1





<Elizabeth Murphy - Last Filed: 11/17/18 00:51>





Objective





- Vital Signs/Intake and Output


Vital Signs (last 24 hours): 


                                        











Temp Pulse Resp BP Pulse Ox


 


 98.2 F   82   20   119/83   100 


 


 11/16/18 15:00  11/16/18 15:00  11/16/18 15:00  11/16/18 15:00  11/16/18 15:00








Intake and Output: 


                                        











 11/16/18 11/17/18





 18:59 06:59


 


Intake Total  50


 


Balance  50














- Medications


Medications: 


                               Current Medications





Acetaminophen (Tylenol 325mg Tab)  650 mg PO Q6 PRN


   PRN Reason: Pain, moderate (4-7)


   Last Admin: 11/17/18 00:37 Dose:  650 mg


Acetaminophen (Tylenol 325mg Tab)  650 mg PO Q6 PRN


   PRN Reason: Fever >100.4 F


Famotidine (Pepcid)  20 mg PO BID [facility]


   Last Admin: 11/16/18 17:39 Dose:  20 mg


Heparin Sodium (Porcine) (Heparin)  5,000 units SC Q12 [facility]


   Last Admin: 11/16/18 21:57 Dose:  5,000 units


Clindamycin Phosphate (Cleocin 600mg/50ml Ns)  600 mg in 50 mls @ 100 mls/hr 

IVPB Q8H [facility]; Protocol


   Last Admin: 11/16/18 19:20 Dose:  100 mls/hr


Influenza Virus Vaccine (Fluzone Quad 0135-3220)  60 mcg IM .ONCE ONE


   Stop: 11/17/18 12:01


Lactobacillus Acidophilus (Bacid Acidophilus)  1 cap PO BID [facility]


   Last Admin: 11/16/18 17:39 Dose:  1 cap


Pneumococcal Polyvalent Vaccine (Pneumovax 23 Vaccine)  0.5 ml IM .ONCE ONE


   Stop: 11/17/18 12:01


Tamsulosin HCl (Flomax)  0.4 mg PO DAILY [facility]


   Last Admin: 11/16/18 10:43 Dose:  0.4 mg











- Labs


Labs: 


                                        





                                 11/16/18 06:46 





                                 11/16/18 06:46 





                                        











PT  11.9 SECONDS (9.7-12.2)   11/16/18  06:46    


 


INR  1.1   11/16/18  06:46    


 


APTT  37 SECONDS (21-34)  H  11/16/18  06:46    














Attending/Attestation





- Attestation


I have personally seen and examined this patient.: Yes


I have fully participated in the care of the patient.: Yes


I have reviewed all pertinent clinical information, including history, physical 

exam and plan: Yes


Notes (Text): 


This is late computer entry for 11/16/18.


Patient seen, examined, and case discussed with day-time resident.


Patient underwent bedside I&D with surgery yesterday he is postoperative day 

one. Patient had repeat wound culture which we are pending result.


Patient denies acute complaints except for pain when surgery completed dressing 

change prior to my arrival.


Patient is currently on Clindamycin IVPB to cover for MRSA, awaiting repeat to 

confirm and show clinda is most efficiaous for the patient.





Assessment/Plan


1) LLE Cellulitis and Phlegmon


Assessment/Plan


* previous history of skin nodules on LE that resolve with I&D


* likely hidratenitis suppurativa


* Wound Cx (11/13): MRSA, sensitive to Vanc, Clinda


* Contact Isolation precautions taken


* Urine Cx (11/13): neg


* Blood Cx (11/13): neg @ 48hrs


* MRSA nares screen neg


* IV abx switched from: Vanc 1g IVPB q12 (11/13-11/15) to Clindamycin increased 

  Clindamycin 600mg IVPB q8 (started 11/15)


* Procal <0.05


* CT LE (11/13): 2.5x1.0cm dermal thickening and phlegmon at midline posterior 

  calf subQ fat and dermis with L leg cellulitis, no appreciable abscess, no 

  osteomyelitis.


* Doppler LE: no DVT


* pt afebrile, no leukocytosis


* tylenol PRN


* Surgery consulted: Dr. Finch - help appreciated


   Bedside I&D 11/15


   f/u wound Cx and pathology: prelim Staph Aureus





2) BPH


Assessment/Plan


* 7month h/o dysuria, pyruia, increased urinary frequency


* Hgb A1c: 5.2


* UA neg


* Urine Cx neg


* Flomax 0.4mg po daily





3) PPX


Assessment/Plan


DVT: Heparin 5000u sc q12


GI: Pepcid 20 po bid


Diet: HHD


Dispo: Discharge is pending surgery clearance and repeat wound culture from 

bedside I&D. patient is currently clindamycin. We will need surgery instructions

in regards to wound care to prepare for discharge if patient leaves the hospital

over the weekend.

## 2018-11-16 NOTE — CP.PCM.PN
Subjective





- Date & Time of Evaluation


Date of Evaluation: 11/16/18


Time of Evaluation: 07:30





- Subjective


Subjective: 


surgery progress note for Dr. Finch





pt seen and examined this AM. No adverse events overnight. Patient still has 

pain but states it is better than yesterday. Patient has been ambulating, denies

fevers





Objective





- Vital Signs/Intake and Output


Vital Signs (last 24 hours): 


                                        











Temp Pulse Resp BP Pulse Ox


 


 97.9 F   61   20   140/89   98 


 


 11/16/18 07:30  11/16/18 07:30  11/16/18 07:30  11/16/18 07:30  11/16/18 07:30








Intake and Output: 


                                        











 11/16/18 11/16/18





 06:59 18:59


 


Intake Total 600 


 


Balance 600 














- Medications


Medications: 


                               Current Medications





Acetaminophen (Tylenol 325mg Tab)  650 mg PO Q6 PRN


   PRN Reason: Pain, moderate (4-7)


   Last Admin: 11/16/18 07:09 Dose:  650 mg


Acetaminophen (Tylenol 325mg Tab)  650 mg PO Q6 PRN


   PRN Reason: Fever >100.4 F


Famotidine (Pepcid)  20 mg PO BID [facility]


   Last Admin: 11/16/18 10:43 Dose:  20 mg


Heparin Sodium (Porcine) (Heparin)  5,000 units SC Q12 [facility]


   Last Admin: 11/16/18 10:44 Dose:  5,000 units


Clindamycin Phosphate (Cleocin 600mg/50ml Ns)  600 mg in 50 mls @ 100 mls/hr 

IVPB Q8H [facility]; Protocol


   Last Admin: 11/16/18 10:56 Dose:  100 mls/hr


Influenza Virus Vaccine (Fluzone Quad [id number])  60 mcg IM .ONCE ONE


   Stop: 11/17/18 12:01


Lactobacillus Acidophilus (Bacid Acidophilus)  1 cap PO BID [facility]


   Last Admin: 11/16/18 10:44 Dose:  1 cap


Pneumococcal Polyvalent Vaccine (Pneumovax 23 Vaccine)  0.5 ml IM .ONCE ONE


   Stop: 11/17/18 12:01


Tamsulosin HCl (Flomax)  0.4 mg PO DAILY [facility]


   Last Admin: 11/16/18 10:43 Dose:  0.4 mg











- Labs


Labs: 


                                        





                                 11/16/18 06:46 





                                 11/16/18 06:46 





                                        











PT  11.9 SECONDS (9.7-12.2)   11/16/18  06:46    


 


INR  1.1   11/16/18  06:46    


 


APTT  37 SECONDS (21-34)  H  11/16/18  06:46    














- Constitutional


Appears: Well, Non-toxic, No Acute Distress





- Head Exam


Head Exam: ATRAUMATIC, NORMOCEPHALIC





- Eye Exam


Eye Exam: Normal appearance.  absent: Conjunctival injection, Scleral icterus





- ENT Exam


ENT Exam: Mucous Membranes Moist, Normal Oropharynx





- Respiratory Exam


Respiratory Exam: NORMAL BREATHING PATTERN.  absent: Accessory Muscle Use, 

Respiratory Distress





- Cardiovascular Exam


Cardiovascular Exam: RRR





- GI/Abdominal Exam


GI & Abdominal Exam: Soft.  absent: Distended, Tenderness





- Extremities Exam


Additional comments: 





right posterior calf with cellulitis and induration surrounding incision and 

drainge site, no purulent fluid or bleeding expressed, no other areas of 

fluctuance





- Neurological Exam


Neurological Exam: Alert, Awake, Oriented x3





- Psychiatric Exam


Psychiatric exam: Normal Affect, Normal Mood





- Skin


Skin Exam: Dry, Normal Color, Warm


Additional comments: 





except as noted above





Assessment and Plan





- Assessment and Plan (Free Text)


Assessment: 





63M with infected hematoma vs abscess of the posterior right lower leg, POD#1 

s/p incision and drainage at bedside


Plan: 





follow up wound cultures


Continue ABX


Daily packing changes


Encourage ambulation


PRN pain medication





Discussed with Dr. Stef Patiño, PGY2

## 2018-11-17 LAB
ALBUMIN SERPL-MCNC: 4.2 G/DL (ref 3.5–5)
ALBUMIN/GLOB SERPL: 1.5 {RATIO} (ref 1–2.1)
ALT SERPL-CCNC: 41 U/L (ref 21–72)
AST SERPL-CCNC: 33 U/L (ref 17–59)
BASOPHILS # BLD AUTO: 0.1 K/UL (ref 0–0.2)
BASOPHILS NFR BLD: 0.9 % (ref 0–2)
BUN SERPL-MCNC: 20 MG/DL (ref 9–20)
CALCIUM SERPL-MCNC: 9.4 MG/DL (ref 8.6–10.4)
EOSINOPHIL # BLD AUTO: 0.3 K/UL (ref 0–0.7)
EOSINOPHIL NFR BLD: 3.5 % (ref 0–4)
ERYTHROCYTE [DISTWIDTH] IN BLOOD BY AUTOMATED COUNT: 13.7 % (ref 11.5–14.5)
GFR NON-AFRICAN AMERICAN: > 60
HGB BLD-MCNC: 13.5 G/DL (ref 12–18)
LYMPHOCYTES # BLD AUTO: 1.9 K/UL (ref 1–4.3)
LYMPHOCYTES NFR BLD AUTO: 22.8 % (ref 20–40)
MCH RBC QN AUTO: 32 PG (ref 27–31)
MCHC RBC AUTO-ENTMCNC: 35.4 G/DL (ref 33–37)
MCV RBC AUTO: 90.6 FL (ref 80–94)
MONOCYTES # BLD: 0.8 K/UL (ref 0–0.8)
MONOCYTES NFR BLD: 9.2 % (ref 0–10)
NEUTROPHILS # BLD: 5.2 K/UL (ref 1.8–7)
NEUTROPHILS NFR BLD AUTO: 63.6 % (ref 50–75)
NRBC BLD AUTO-RTO: 0 % (ref 0–2)
PLATELET # BLD: 275 K/UL (ref 130–400)
PMV BLD AUTO: 9.5 FL (ref 7.2–11.7)
RBC # BLD AUTO: 4.21 MIL/UL (ref 4.4–5.9)
WBC # BLD AUTO: 8.2 K/UL (ref 4.8–10.8)

## 2018-11-17 RX ADMIN — CLINDAMYCIN PHOSPHATE SCH MLS/HR: 150 INJECTION, SOLUTION INTRAMUSCULAR; INTRAVENOUS at 03:10

## 2018-11-17 RX ADMIN — CLINDAMYCIN PHOSPHATE SCH MLS/HR: 150 INJECTION, SOLUTION INTRAMUSCULAR; INTRAVENOUS at 19:21

## 2018-11-17 RX ADMIN — Medication SCH CAP: at 10:03

## 2018-11-17 RX ADMIN — Medication SCH CAP: at 17:51

## 2018-11-17 RX ADMIN — CLINDAMYCIN PHOSPHATE SCH MLS/HR: 150 INJECTION, SOLUTION INTRAMUSCULAR; INTRAVENOUS at 10:04

## 2018-11-17 NOTE — CP.PCM.PN
Subjective





- Date & Time of Evaluation


Date of Evaluation: 11/17/18


Time of Evaluation: 09:10





- Subjective


Subjective: 





Medical Attending note:





patient seen and examined this morning.


Patient reports he started having diarrhea yesterday morning. Patient denies 

headache, denies chest pain, denies palpitations, denies abdominal pain, denies 

nausea, denies vomitting, reports dressing was changed by surgery this morning, 

patient noted right hip pain usuallay controlled by motrin prn, he was advised 

to use only as needed, denies dysuria.





Objective





- Vital Signs/Intake and Output


Vital Signs (last 24 hours): 


                                        











Temp Pulse Resp BP Pulse Ox


 


 97.8 F   56 L  20   124/70   97 


 


 11/17/18 07:20  11/17/18 07:20  11/17/18 07:20  11/17/18 07:20  11/17/18 07:20








Intake and Output: 


                                        











 11/17/18 11/17/18





 06:59 18:59


 


Intake Total 50 


 


Balance 50 














- Medications


Medications: 


                               Current Medications





Acetaminophen (Tylenol 325mg Tab)  650 mg PO Q6 PRN


   PRN Reason: Pain, moderate (4-7)


   Last Admin: 11/17/18 07:18 Dose:  650 mg


Acetaminophen (Tylenol 325mg Tab)  650 mg PO Q6 PRN


   PRN Reason: Fever >100.4 F


Famotidine (Pepcid)  20 mg PO BID [facility]


   Last Admin: 11/16/18 17:39 Dose:  20 mg


Heparin Sodium (Porcine) (Heparin)  5,000 units SC Q12 [facility]


   Last Admin: 11/16/18 21:57 Dose:  5,000 units


Clindamycin Phosphate (Cleocin 600mg/50ml Ns)  600 mg in 50 mls @ 100 mls/hr 

IVPB Q8H [facility]; Protocol


   Last Admin: 11/17/18 03:10 Dose:  100 mls/hr


Ibuprofen (Motrin Tab)  600 mg PO TID PRN


   PRN Reason: Pain, moderate (4-7)


Influenza Virus Vaccine (Fluzone Quad [id number])  60 mcg IM .ONCE ONE


   Stop: 11/17/18 12:01


Lactobacillus Acidophilus (Bacid Acidophilus)  1 cap PO BID [facility]


   Last Admin: 11/16/18 17:39 Dose:  1 cap


Pneumococcal Polyvalent Vaccine (Pneumovax 23 Vaccine)  0.5 ml IM .ONCE ONE


   Stop: 11/17/18 12:01


Tamsulosin HCl (Flomax)  0.4 mg PO DAILY [facility]


   Last Admin: 11/16/18 10:43 Dose:  0.4 mg











- Labs


Labs: 


                                        





                                 11/17/18 06:35 





                                 11/17/18 06:35 





                                        











PT  11.9 SECONDS (9.7-12.2)   11/16/18  06:46    


 


INR  1.1   11/16/18  06:46    


 


APTT  37 SECONDS (21-34)  H  11/16/18  06:46    














- Constitutional


Appears: Non-toxic, No Acute Distress





- Head Exam


Head Exam: NORMAL INSPECTION





- Eye Exam


Eye Exam: EOMI





- ENT Exam


ENT Exam: Mucous Membranes Moist





- Respiratory Exam


Respiratory Exam: Clear to Ausculation Bilateral, NORMAL BREATHING PATTERN.  

absent: Rales, Rhonchi, Wheezes





- Cardiovascular Exam


Cardiovascular Exam: REGULAR RHYTHM, +S1, +S2





- GI/Abdominal Exam


GI & Abdominal Exam: Soft, Normal Bowel Sounds.  absent: Distended, Firm, 

Guarding, Rigid, Tenderness, Rebound





- Extremities Exam


Extremities Exam: absent: Pedal Edema, Tenderness


Additional comments: 





left lower calf: dressing clean dry intact


erythema resolving





- Neurological Exam


Neurological Exam: Alert, Awake, Oriented x3





- Psychiatric Exam


Psychiatric exam: Normal Affect, Normal Mood





- Skin


Skin Exam: Dry, Normal Color, Warm


Additional comments: 





except for noted surgery site





Assessment and Plan


(1) Cellulitis and abscess of leg


Status: Acute   





(2) MRSA cellulitis


Status: Acute   





(3) Diarrhea


Status: Acute   





(4) Prophylactic measure


Status: Acute   





Attending/Attestation





- Attestation


I have personally seen and examined this patient.: Yes


I have fully participated in the care of the patient.: Yes


I have reviewed all pertinent clinical information, including history, physical 

exam and plan: Yes


Notes (Text): 





Assessment/Plan


1) LLE Cellulitis and Phlegmon


Assessment/Plan


* previous history of skin nodules on LE that resolve with I&D


* likely hidratenitis suppurativa


* Wound Cx (11/13): MRSA, sensitive to Vanc, Clinda


* Contact Isolation precautions taken


* Urine Cx (11/13): neg


* Blood Cx (11/13): neg 3 days 


* MRSA nares screen neg


* IV abx switched from: Vanc 1g IVPB q12 (11/13-11/15) to Clindamycin increased 

  Clindamycin 600mg IVPB q8 (started 11/15)


* Procal <0.05


* CT LE (11/13): 2.5x1.0cm dermal thickening and phlegmon at midline posterior 

  calf subQ fat and dermis with L leg cellulitis, no appreciable abscess, no 

  osteomyelitis.


* Doppler LE: no DVT


* pt afebrile, no leukocytosis


* tylenol PRN


* Surgery consulted: Dr. Finch - help appreciated


   * Wound Cx (11/15): MRSA sensitive to clindamycin


   * Awaiting pathology


   * pending surgery clearance for D/C


   * s/p Bedside I&D 11/15


   


2) BPH


Assessment/Plan


* 7month h/o dysuria, pyruia, increased urinary frequency


* Hgb A1c: 5.2


* UA neg


* Urine Cx neg


* Flomax 0.4mg po daily





3) Diarrhea


* likely secondary to antibiotic


* r/o infectious cause


* Stool ova and parasite, culture, and c dif





4) PPX


Assessment/Plan


* DVT: Heparin 5000u sc q12


* GI: Pepcid 20 po bid


* Diet: HHD





* Dispo: Discharge is pending surgery clearance and repeat wound culture from 

  bedside I&D. patient is currently clindamycin. We will need surgery 

  instructions in regards to wound care to prepare for discharge if patient 

  leaves the hospital over the weekend. Patient had mild diarrhea; ordered for 

  stool studies.

## 2018-11-18 VITALS
RESPIRATION RATE: 18 BRPM | TEMPERATURE: 98.5 F | HEART RATE: 62 BPM | OXYGEN SATURATION: 95 % | SYSTOLIC BLOOD PRESSURE: 101 MMHG | DIASTOLIC BLOOD PRESSURE: 62 MMHG

## 2018-11-18 LAB
ALBUMIN SERPL-MCNC: 4.5 G/DL (ref 3.5–5)
ALBUMIN/GLOB SERPL: 1.5 {RATIO} (ref 1–2.1)
ALT SERPL-CCNC: 98 U/L (ref 21–72)
AST SERPL-CCNC: 92 U/L (ref 17–59)
BASOPHILS # BLD AUTO: 0.1 K/UL (ref 0–0.2)
BASOPHILS NFR BLD: 1 % (ref 0–2)
BUN SERPL-MCNC: 27 MG/DL (ref 9–20)
CALCIUM SERPL-MCNC: 9.4 MG/DL (ref 8.6–10.4)
EOSINOPHIL # BLD AUTO: 0.3 K/UL (ref 0–0.7)
EOSINOPHIL NFR BLD: 2.7 % (ref 0–4)
ERYTHROCYTE [DISTWIDTH] IN BLOOD BY AUTOMATED COUNT: 13.9 % (ref 11.5–14.5)
GFR NON-AFRICAN AMERICAN: > 60
HGB BLD-MCNC: 14.7 G/DL (ref 12–18)
LYMPHOCYTES # BLD AUTO: 1.4 K/UL (ref 1–4.3)
LYMPHOCYTES NFR BLD AUTO: 14.2 % (ref 20–40)
MCH RBC QN AUTO: 31.9 PG (ref 27–31)
MCHC RBC AUTO-ENTMCNC: 34.4 G/DL (ref 33–37)
MCV RBC AUTO: 92.7 FL (ref 80–94)
MONOCYTES # BLD: 0.9 K/UL (ref 0–0.8)
MONOCYTES NFR BLD: 9 % (ref 0–10)
NEUTROPHILS # BLD: 7.4 K/UL (ref 1.8–7)
NEUTROPHILS NFR BLD AUTO: 73.1 % (ref 50–75)
NRBC BLD AUTO-RTO: 0.1 % (ref 0–2)
PLATELET # BLD: 283 K/UL (ref 130–400)
PMV BLD AUTO: 9.8 FL (ref 7.2–11.7)
RBC # BLD AUTO: 4.6 MIL/UL (ref 4.4–5.9)
WBC # BLD AUTO: 10.1 K/UL (ref 4.8–10.8)

## 2018-11-18 RX ADMIN — Medication SCH CAP: at 09:47

## 2018-11-18 RX ADMIN — CLINDAMYCIN PHOSPHATE SCH MLS/HR: 150 INJECTION, SOLUTION INTRAMUSCULAR; INTRAVENOUS at 11:26

## 2018-11-18 RX ADMIN — CLINDAMYCIN PHOSPHATE SCH MLS/HR: 150 INJECTION, SOLUTION INTRAMUSCULAR; INTRAVENOUS at 04:18

## 2018-11-18 NOTE — CP.PCM.PN
Subjective





- Date & Time of Evaluation


Date of Evaluation: 11/18/18


Time of Evaluation: 11:08





- Subjective


Subjective: 


General Surgery Progress Note for Dr. Finch





This 63M was seen and examined this AM at bedside. No acute events overnight. 

Patient complains of knee pain and limited range of motion.  Patient has been 

ambulating, denies fevers, chills chest pain or SOB. 








Objective





- Vital Signs/Intake and Output


Vital Signs (last 24 hours): 


                                        











Temp Pulse Resp BP Pulse Ox


 


 97.4 F L  70   20   121/82   98 


 


 11/18/18 07:00  11/18/18 07:00  11/18/18 07:00  11/18/18 07:00  11/18/18 07:00











- Medications


Medications: 


                               Current Medications





Acetaminophen (Tylenol 325mg Tab)  650 mg PO Q6 PRN


   PRN Reason: Pain, moderate (4-7)


   Last Admin: 11/18/18 09:50 Dose:  650 mg


Acetaminophen (Tylenol 325mg Tab)  650 mg PO Q6 PRN


   PRN Reason: Fever >100.4 F


Famotidine (Pepcid)  20 mg PO BID [facility]


   Last Admin: 11/18/18 09:47 Dose:  20 mg


Heparin Sodium (Porcine) (Heparin)  5,000 units SC Q12 [facility]


   Last Admin: 11/18/18 09:48 Dose:  5,000 units


Clindamycin Phosphate (Cleocin 600mg/50ml Ns)  600 mg in 50 mls @ 100 mls/hr 

IVPB Q8H [facility]; Protocol


   Last Admin: 11/18/18 04:18 Dose:  100 mls/hr


Ibuprofen (Motrin Tab)  600 mg PO TID PRN


   PRN Reason: Pain, moderate (4-7)


   Last Admin: 11/17/18 10:03 Dose:  600 mg


Lactobacillus Acidophilus (Bacid Acidophilus)  1 cap PO BID [facility]


   Last Admin: 11/18/18 09:47 Dose:  1 cap


Tamsulosin HCl (Flomax)  0.4 mg PO DAILY [facility]


   Last Admin: 11/18/18 09:47 Dose:  0.4 mg











- Labs


Labs: 


                                        





                                 11/18/18 07:58 





                                 11/18/18 07:58 





                                        











PT  11.9 SECONDS (9.7-12.2)   11/16/18  06:46    


 


INR  1.1   11/16/18  06:46    


 


APTT  37 SECONDS (21-34)  H  11/16/18  06:46    








- Constitutional


Appears: Well, Non-toxic, No Acute Distress





- Head Exam


Head Exam: ATRAUMATIC, NORMOCEPHALIC





- Eye Exam


Eye Exam: Normal appearance.  absent: Conjunctival injection, Scleral icterus





- ENT Exam


ENT Exam: Mucous Membranes Moist, Normal Oropharynx





- Respiratory Exam


Respiratory Exam: NORMAL BREATHING PATTERN.  absent: Accessory Muscle Use, 

Respiratory Distress





- Cardiovascular Exam


Cardiovascular Exam: RRR





- GI/Abdominal Exam


GI & Abdominal Exam: Soft.  absent: Distended, Tenderness





- Extremities Exam


Additional comments: 





Calf dressing with minimal draingae, no purulence. no erythema no fluctuance. 

Knee tender. 





- Neurological Exam


Neurological Exam: Alert, Awake, Oriented x3





- Psychiatric Exam


Psychiatric exam: Normal Affect, Normal Mood





Assessment and Plan





- Assessment and Plan (Free Text)


Assessment: 


63M with infected hematoma vs abscess of the posterior right lower leg, POD#3 

s/p incision and drainage at bedside


Wound culture MRSA





Plan: 





Consider switching to PO ABX


Recommend ortho consult due to knee pain and tenderness. 


Otherwise no more surgical intervention necessary


Recommend daily dressing changes and to keep wound bed clean


Discussed with Dr. Stef Morin PGY3


[phone]

## 2018-11-18 NOTE — CP.PCM.DIS
Provider





- Provider


Date of Admission: 


11/15/18 13:47





Attending physician: 


Elizabeth Murphy DO





Consults: 


Surgery: Dr. Finch 


Time Spent in preparation of Discharge (in minutes): 45





Hospital Course





- Lab Results


Lab Results: 


                                  Micro Results





11/13/18 15:27   Blood   Blood Culture - Preliminary


                            NO GROWTH AFTER 4 DAYS


11/15/18 14:45   Leg - Left   Gram Stain - Final


11/15/18 14:45   Leg - Left   Wound Culture - Final


                            Methicillin Resistant S Aureus


11/13/18 14:09   Blood   Blood Culture - Preliminary


                            NO GROWTH AFTER 4 DAYS


11/14/18 21:44   Naris   MRSA Culture (Admit) - Final


                            MRSA NOT DETECTED


11/13/18 14:42   Leg - Right   Gram Stain - Final


11/13/18 14:42   Leg - Right   Wound Culture - Final


                            Methicillin Resistant S Aureus


11/13/18 16:11   Urine   Urine Culture - Final


                            No Growth (<1,000 CFU/ML)





                             Most Recent Lab Values











WBC  10.1 K/uL (4.8-10.8)   11/18/18  07:58    


 


RBC  4.60 Mil/uL (4.40-5.90)   11/18/18  07:58    


 


Hgb  14.7 g/dL (12.0-18.0)   11/18/18  07:58    


 


Hct  42.6 % (35.0-51.0)   11/18/18  07:58    


 


MCV  92.7 fL (80.0-94.0)  D 11/18/18  07:58    


 


MCH  31.9 pg (27.0-31.0)  H  11/18/18  07:58    


 


MCHC  34.4 g/dL (33.0-37.0)   11/18/18  07:58    


 


RDW  13.9 % (11.5-14.5)   11/18/18  07:58    


 


Plt Count  283 K/uL (130-400)   11/18/18  07:58    


 


MPV  9.8 fL (7.2-11.7)   11/18/18  07:58    


 


Neut % (Auto)  73.1 % (50.0-75.0)   11/18/18  07:58    


 


Lymph % (Auto)  14.2 % (20.0-40.0)  L  11/18/18  07:58    


 


Mono % (Auto)  9.0 % (0.0-10.0)   11/18/18  07:58    


 


Eos % (Auto)  2.7 % (0.0-4.0)   11/18/18  07:58    


 


Baso % (Auto)  1.0 % (0.0-2.0)   11/18/18  07:58    


 


Neut # (Auto)  7.4 K/uL (1.8-7.0)  H  11/18/18  07:58    


 


Lymph # (Auto)  1.4 K/uL (1.0-4.3)   11/18/18  07:58    


 


Mono # (Auto)  0.9 K/uL (0.0-0.8)  H  11/18/18  07:58    


 


Eos # (Auto)  0.3 K/uL (0.0-0.7)   11/18/18  07:58    


 


Baso # (Auto)  0.1 K/uL (0.0-0.2)   11/18/18  07:58    


 


Neutrophils % (Manual)  86 % (50-75)  H  11/14/18  04:45    


 


Lymphocytes % (Manual)  8 % (20-40)  L  11/14/18  04:45    


 


Monocytes % (Manual)  6 % (0-10)   11/14/18  04:45    


 


Platelet Estimate  Normal  (NORMAL)   11/14/18  04:45    


 


Poikilocytosis (manual  Slight   11/14/18  04:45    


 


Anisocytosis (manual)  Slight   11/14/18  04:45    


 


PT  11.9 SECONDS (9.7-12.2)   11/16/18  06:46    


 


INR  1.1   11/16/18  06:46    


 


APTT  37 SECONDS (21-34)  H  11/16/18  06:46    


 


Sodium  137 mmol/L (132-148)   11/18/18  07:58    


 


Potassium  4.9 mmol/L (3.6-5.2)   11/18/18  07:58    


 


Chloride  99 mmol/L ()   11/18/18  07:58    


 


Carbon Dioxide  23 mmol/L (22-30)   11/18/18  07:58    


 


Anion Gap  20  (10-20)   11/18/18  07:58    


 


BUN  27 mg/dL (9-20)  H  11/18/18  07:58    


 


Creatinine  1.1 mg/dL (0.8-1.5)   11/18/18  07:58    


 


Est GFR ( Amer)  > 60   11/18/18  07:58    


 


Est GFR (Non-Af Amer)  > 60   11/18/18  07:58    


 


Random Glucose  96 mg/dL ()   11/18/18  07:58    


 


Hemoglobin A1c  5.2 % (4.2-6.5)   11/14/18  04:45    


 


Calcium  9.4 mg/dl (8.6-10.4)   11/18/18  07:58    


 


Phosphorus  4.6 mg/dL (2.5-4.5)  H  11/17/18  06:35    


 


Magnesium  1.9 mg/dL (1.6-2.3)   11/17/18  06:35    


 


Total Bilirubin  0.4 mg/dL (0.2-1.3)   11/18/18  07:58    


 


AST  92 U/L (17-59)  H D 11/18/18  07:58    


 


ALT  98 U/L (21-72)  H D 11/18/18  07:58    


 


Alkaline Phosphatase  49 U/L ()   11/18/18  07:58    


 


Total Creatine Kinase  69 U/L ()   11/14/18  07:07    


 


CK-MB (Mass)  0.69 ng/mL (0.0-3.38)   11/14/18  07:07    


 


Troponin I  < 0.0120 ng/mL (0.00-0.120)   11/14/18  07:07    


 


Total Protein  7.5 g/dL (6.3-8.3)   11/18/18  07:58    


 


Albumin  4.5 g/dL (3.5-5.0)   11/18/18  07:58    


 


Globulin  3.0 gm/dL (2.2-3.9)   11/18/18  07:58    


 


Albumin/Globulin Ratio  1.5  (1.0-2.1)   11/18/18  07:58    


 


Procalcitonin  < 0.05 NG/ML (0.19-0.49)  L  11/14/18  04:45    


 


Urine Color  Yellow  (YELLOW)   11/13/18  16:11    


 


Urine Clarity  Clear  (Clear)   11/13/18  16:11    


 


Urine pH  6.0  (5.0-8.0)   11/13/18  16:11    


 


Ur Specific Gravity  1.019  (1.003-1.030)   11/13/18  16:11    


 


Urine Protein  Negative mg/dL (NEGATIVE)   11/13/18  16:11    


 


Urine Glucose (UA)  Normal mg/dL (Normal)   11/13/18  16:11    


 


Urine Ketones  Negative mg/dL (NEGATIVE)   11/13/18  16:11    


 


Urine Blood  Negative  (NEGATIVE)   11/13/18  16:11    


 


Urine Nitrate  Negative  (NEGATIVE)   11/13/18  16:11    


 


Urine Bilirubin  Negative  (NEGATIVE)   11/13/18  16:11    


 


Urine Urobilinogen  2.0 mg/dL (0.2-1.0)   11/13/18  16:11    


 


Ur Leukocyte Esterase  Neg Leah/uL (Negative)   11/13/18  16:11    


 


Urine WBC (Auto)  2 /hpf (0-5)   11/13/18  16:11    


 


Urine RBC (Auto)  2 /hpf (0-3)   11/13/18  16:11    


 


Ur Squamous Epith Cells  < 1 /hpf (0-5)   11/13/18  16:11    


 


Urine Bacteria  Rare  (<OCC)   11/13/18  16:11    


 


Vancomycin Trough  7.7 ug/mL (5.0-10.0)   11/15/18  11:10    


 


C. difficile Ag & Toxin  Negative  (NEGATIVE)   11/17/18  20:30    


 


Influenza Typ A,B (EIA)  Negative for flu a/b  (NEGATIVE)   11/15/18  17:56    














- Hospital Course


Hospital Course: 





1) LLE Cellulitis and Phlegmon





   * Wound Cx (11/15): MRSA sensitive to clindamycin 


   * c/w clindamycin 300mg PO TID for 10 days


   * florastor BID 2 hours before or after antibiotics; not with antibiotics 


* as per surgery, change bandaid after cleans self, no special wound care. keep 

  clean dry 


   


   


2) BPH


Assessment/Plan


* take tamsulosin 0.4mg daily for BPH


* fu with urology as outpatient 


* 


* the patient is stable for d/c as per Dr. Lewis 





Discharge Exam





- Head Exam


Head Exam: NORMAL INSPECTION





- Eye Exam


Eye Exam: EOMI, Normal appearance, PERRL





- ENT Exam


ENT Exam: Mucous Membranes Moist





- Neck Exam


Neck exam: Full Rom





- Respiratory Exam


Respiratory Exam: Clear to PA & Lateral, NORMAL BREATHING PATTERN.  absent: 

Rales, Rhonchi, Wheezes





- Cardiovascular Exam


Cardiovascular Exam: REGULAR RHYTHM, +S1, +S2





- GI/Abdominal Exam


GI & Abdominal Exam: Normal Bowel Sounds, Soft





- Extremities Exam


Additional comments: 


wound is non erythematous and healing well 





- Back Exam


Back exam: absent: CVA tenderness (L), CVA tenderness (R)





- Neurological Exam


Neurological exam: Alert, CN II-XII Intact, Oriented x3, Reflexes Normal





Discharge Plan





- Discharge Medications


Prescriptions: 


Clindamycin [Cleocin] 300 mg PO TID 14 Days #42 cap


Saccharomyces Boulardi [Florastor] 250 mg PO BID 30 Days #60 cap


Tamsulosin [Flomax] 0.4 mg PO DAILY #30 cap





- Follow Up Plan


Condition: STABLE


Disposition: HOME/ ROUTINE


Instructions:  Abscess (GEN), MRSA (Methicillin Resistant Staphylococcus Aureus)

 (DC), MRSA (Methicillin Resistant Staphylococcus Aureus) (GEN), Cellulitis 

(GEN)

## 2018-11-19 NOTE — CARD
--------------- APPROVED REPORT --------------





Date of service: 11/13/2018



EKG Measurement

Heart [id number]

[id number]

[id number]

[id number]

[id number]



<Conclusion>

Normal sinus rhythm

Normal ECG